# Patient Record
Sex: MALE | Race: WHITE
[De-identification: names, ages, dates, MRNs, and addresses within clinical notes are randomized per-mention and may not be internally consistent; named-entity substitution may affect disease eponyms.]

---

## 2018-12-20 ENCOUNTER — TRANSCRIPTION ENCOUNTER (OUTPATIENT)
Age: 33
End: 2018-12-20

## 2020-03-10 ENCOUNTER — APPOINTMENT (OUTPATIENT)
Dept: HUMAN REPRODUCTION | Facility: CLINIC | Age: 35
End: 2020-03-10

## 2021-04-26 ENCOUNTER — EMERGENCY (EMERGENCY)
Facility: HOSPITAL | Age: 36
LOS: 1 days | Discharge: HOME | End: 2021-04-26
Attending: STUDENT IN AN ORGANIZED HEALTH CARE EDUCATION/TRAINING PROGRAM | Admitting: STUDENT IN AN ORGANIZED HEALTH CARE EDUCATION/TRAINING PROGRAM
Payer: COMMERCIAL

## 2021-04-26 VITALS
RESPIRATION RATE: 18 BRPM | HEART RATE: 80 BPM | DIASTOLIC BLOOD PRESSURE: 79 MMHG | TEMPERATURE: 98 F | SYSTOLIC BLOOD PRESSURE: 124 MMHG | OXYGEN SATURATION: 100 %

## 2021-04-26 PROCEDURE — 99284 EMERGENCY DEPT VISIT MOD MDM: CPT

## 2021-04-26 PROCEDURE — 70481 CT ORBIT/EAR/FOSSA W/DYE: CPT | Mod: 26

## 2021-04-26 RX ORDER — KETOROLAC TROMETHAMINE 30 MG/ML
15 SYRINGE (ML) INJECTION ONCE
Refills: 0 | Status: DISCONTINUED | OUTPATIENT
Start: 2021-04-26 | End: 2021-04-26

## 2021-04-26 NOTE — ED PROVIDER NOTE - PHYSICAL EXAMINATION
Gen: WDWN, NAD  HEENT: EOMI, no nasal discharge, mucous membranes moist. TM's clear b/l. pupils equal and reactive b/l.   CV: RRR, +S1/S2, no M/R/G  Resp: CTAB, no W/R/R  GI: Abdomen soft non-distended, NTTP, no masses  MSK: No open wounds, no bruising, no LE edema  Neuro: A&Ox4, following commands, moving all four extremities spontaneously  Psych: appropriate mood

## 2021-04-26 NOTE — ED ADULT NURSE NOTE - OBJECTIVE STATEMENT
Pt presents to intake, A&Ox4, ambulatory w/o assistance, here for evaluation of "throbbing of my left ear." Denies chest pain, shortness of breath, palpitations, diaphoresis, headaches, fevers, dizziness, nausea, vomiting, diarrhea, or urinary symptoms at this time. IV established in left arm with a 20G, no labs drawn at this time as ordered by MD. Call bell in reach, warm blanket provided, bed in lowest position, side rails up x2, MD evaluation in progress.

## 2021-04-26 NOTE — ED PROVIDER NOTE - ATTENDING CONTRIBUTION TO CARE
J Luis DAVID: I agree with the above provided history and exam and addend/modify it as follows.    36M w/out pmh - p/w acute onset sharp L internal ear pain x 3 days, intermittent, lasts several seconds then resolved. Denies any other complaints. No fever, n/v/d/c, chest / abd pain, cough, sob, dizziness, dysuria/hematuria. No recent travel, medication change, illness, or hospitalization. Patient denies recent trauma. Declines pain meds at this time, because is currently asymptomatic. Also reports mild generalized HA that he thinks is separate from the ear pain. No tinnitus, vertigo.     *GEN:   comfortable, in no acute distress, AOx3  *EYES:   pupils equally round and reactive to light, extra-occular movements intact  *HEENT:   airway patent; internal ear exam w/ ear wax, but visualized TM bilat appears intact, no exudate/erythema; jaw/face without TTP or limited ROM  *CV:   regular rate and rhythm  *RESP:   clear to auscultation bilaterally, non-labored  *ABD:   soft, non-tender  *:   no cva/flank tenderness  *EXTREM:   no MSK tenderness, full ROM throughout, no leg swelling  *SKIN:   dry, intact  *NEURO:   AOx3, cranial nerves intact throughout, strength 5/5, no focal loss of sensation, no pronator drift, finger/nose normal, ambulating w/ normal gait     36M p/w acute onset L internal ear pain, normal exam, well appearing; unlikely ICH / pathologic cause, given patient's high degree of concern and request for CT, is reasonable to check screening CT IAC, reassess, if wnl likely dc w/ close outpt f/u    I Grant Dietz MD performed a history and physical exam of the patient and discussed their management with the resident and /or advanced care provider. I reviewed the resident and /or ACP's note and agree with the documented findings and plan of care. My medical decision making and observations are found above.

## 2021-04-26 NOTE — ED ADULT TRIAGE NOTE - CHIEF COMPLAINT QUOTE
c/o left sides HA for the past 3 days, worse today, reports pain around let ear area, no injury noted, denies medical hx.

## 2021-04-26 NOTE — ED PROVIDER NOTE - NSFOLLOWUPINSTRUCTIONS_ED_ALL_ED_FT
1. take tylenol or motrin as needed for pain. 2. today, the imaging tests we did include CT. all the results were not concerning, and you are ready to go home today. we have included these test results in your paperwork. 3. given that you were in the ED today, we recommend a followup visit with your general doctor (primary care doctor) within 7 days. 4. your diagnosis is: ear pain 5. return to the ED if your current symptoms worsen, if your pain doesn't resolve with tylenol/motrin.

## 2021-04-26 NOTE — ED PROVIDER NOTE - CLINICAL SUMMARY MEDICAL DECISION MAKING FREE TEXT BOX
Rosalinda Lipscomb, PGY-1: 36y Male, no pmhx, complaining of L inner ear pain x7 days. pulsating, intermittent, no drainage or tinnitus. low suspicion otitis externa, TM perforation. plan for CT, pain control, DC.

## 2021-04-26 NOTE — ED PROVIDER NOTE - PATIENT PORTAL LINK FT
You can access the FollowMyHealth Patient Portal offered by [facility] by registering at the following website: [email or web address]. By joining [employer]’s FollowMyHealth portal, you will also be able to view your health information using other applications (apps) compatible with our system.

## 2021-04-26 NOTE — ED PROVIDER NOTE - OBJECTIVE STATEMENT
36y Male, no pmhx, complaining of L inner ear pain x7 days. pulsating, intermittent, no drainage or tinnitus. no hx of these symptoms. no fevers, no hearing loss. denies headache.

## 2021-04-26 NOTE — ED PROVIDER NOTE - NS ED ROS FT
Gen: Denies fevers  HEENT: + L ear pain, no headache.   CV: Denies chest pain  Skin: denies color changes  Resp: Denies SOB, cough  Endo: Denies increased urination  GI: Denies nausea, vomiting  Msk: Denies extremity pain  : Denies dysuria

## 2023-05-17 NOTE — ED ADULT TRIAGE NOTE - TEMPERATURE IN CELSIUS (DEGREES C)
Follow the instructions given and use the medication as directed for nausea and vomiting.  Advance your diet slowly and follow-up with your doctor as needed.  You will need follow-up for repeat imaging of your kidney to make sure the spot is not growing within 6 months.  Return immediately to the ER with any worsening symptoms including vomiting, abdominal pain, fevers or any other concerns.   36.8